# Patient Record
(demographics unavailable — no encounter records)

---

## 2024-11-27 NOTE — HISTORY OF PRESENT ILLNESS
[Mother] : mother [Hours between feeds ___] : Child is fed every [unfilled] hours [Breast milk] : breast milk [Formula ___ oz/feed] : [unfilled] oz of formula per feed [Fruit] : fruit [Vegetables] : vegetables [Table food] : table food [Normal] : Normal [In crib] : In crib [Pacifier use] : Pacifier use [Toothpaste] : Primary Fluoride Source: Toothpaste [Playtime] : Playtime  [No] : No cigarette smoke exposure [Car seat in back seat] : Car seat in back seat [Up to date] : Up to date

## 2024-11-27 NOTE — DISCUSSION/SUMMARY
[Family Support] : family support [Establishing Routines] : establishing routines [Feeding and Appetite Changes] : feeding and appetite changes [Establishing A Dental Home] : establishing a dental home [Safety] : safety [] : The components of the vaccine(s) to be administered today are listed in the plan of care. The disease(s) for which the vaccine(s) are intended to prevent and the risks have been discussed with the caretaker.  The risks are also included in the appropriate vaccination information statements which have been provided to the patient's caregiver.  The caregiver has given consent to vaccinate. [FreeTextEntry1] :  12 month female growing and developing well.  Transition to whole cow's milk. Continue table foods, 3 meals with 2-3 snacks per day. Incorporate up to 6 oz of flourinated water daily in a sippy cup. Brush teeth twice a day with soft toothbrush. Recommend visit to dentist. When in car, keep child in rear-facing car seats until age 2, or until  the maximum height and weight for seat is reached. Put baby to sleep in own crib with no loose or soft bedding. Lower crib matress. Help baby to maintain consistent daily routines and sleep schedule. Recognize stranger and separation anxiety. Ensure home is safe since baby is increasingly mobile. Be within arm's reach of baby at all times. Use consistent, positive discipline. Avoid screen time. Read aloud to baby. Counseling provided on vaccines given today.  Return 1 mo for HepA/Flu Return for 15 mo WCC/MMR &Martin

## 2024-11-27 NOTE — PHYSICAL EXAM
[Alert] : alert [Normocephalic] : normocephalic [Closed Anterior [place]] : closed anterior fontanelle [Red Reflex] : red reflex bilateral [PERRL] : PERRL [Normally Placed Ears] : normally placed ears [Auricles Well Formed] : auricles well formed [Clear Tympanic membranes] : clear tympanic membranes [Light reflex present] : light reflex present [Bony landmarks visible] : bony landmarks visible [Discharge] : no discharge [Nares Patent] : nares patent [Palate Intact] : palate intact [Uvula Midline] : uvula midline [Tooth Eruption] : tooth eruption [Supple, full passive range of motion] : supple, full passive range of motion [Palpable Masses] : no palpable masses [Symmetric Chest Rise] : symmetric chest rise [Clear to Auscultation Bilaterally] : clear to auscultation bilaterally [Regular Rate and Rhythm] : regular rate and rhythm [S1, S2 present] : S1, S2 present [Murmurs] : no murmurs [+2 Femoral Pulses] : (+) 2 femoral pulses [Soft] : soft [Tender] : nontender [Distended] : nondistended [Bowel Sounds] : normoactive bowel sounds [Hepatomegaly] : no hepatomegaly [Splenomegaly] : no splenomegaly [Normal External Genitalia] : normal external genitalia [Clitoromegaly] : no clitoromegaly [Normal Vaginal Introitus] : normal vaginal introitus [No Abnormal Lymph Nodes Palpated] : no abnormal lymph nodes palpated [Symmetric Abduction and Rotation of Hips] : symmetric abduction and rotation of hips [Allis Sign] : negative Allis sign [Straight] : straight [Cranial Nerves Grossly Intact] : cranial nerves grossly intact [Rash or Lesions] : no rash/lesions

## 2024-11-27 NOTE — DEVELOPMENTAL MILESTONES
[Imitates new gestures] : imitates new gestures [Says "Dad" or "Mom" with meaning] : says "Dad" or "Mom" with meaning [Follows a verbal command that] : follows a verbal command that includes a gesture [Stands without support] : stands without support [Picks up food and eats it] : picks up food and eats it

## 2025-01-08 NOTE — PHYSICAL EXAM
[NL] : moves all extremities x4, warm, well perfused x4 [Dry] : dry [Patches] : patches [Face] : face [Legs] : legs

## 2025-01-08 NOTE — DISCUSSION/SUMMARY
[FreeTextEntry1] :  13 month female comes in today tolerating dairy. [] : The components of the vaccine(s) to be administered today are listed in the plan of care. The disease(s) for which the vaccine(s) are intended to prevent and the risks have been discussed with the caretaker.  The risks are also included in the appropriate vaccination information statements which have been provided to the patient's caregiver.  The caregiver has given consent to vaccinate.

## 2025-01-08 NOTE — HISTORY OF PRESENT ILLNESS
[de-identified] : milk protein intolerance [FreeTextEntry6] :  13 mo female has been tolerating yogurt, cheese, baked goods. Mother has given some cow's milk that she feels may make her eczema worse. No blood in stool. No abdominal pain.

## 2025-02-19 NOTE — DISCUSSION/SUMMARY
[FreeTextEntry1] : 14 mo old female here with flu like illness  Well appearing, well hydrated, though febrile in office. motrin given  Clear lungs upon auscultation. Unremarkable oropharynx and otic exam. Supportive care: Nasal saline, suction, humidifier as needed. Drink plenty of fluids. Fever reducing medications as needed. OTC cough suppressants not recommended in children under the age of 7. Can try Zarbee's or Monica's herbal cough soothing syrup. Avoid those that have honey if under 1 year of age. Seek immediate attention for difficulty breathing, dehydration or prolonged fever (more than 5 days)/elevated fever (greater than 105). Check back if symptoms are prolonged, worsening or if new concerns arise. rapid flu test negative, will send rvp and f/u results.   Parent/Guardian agree with and express understanding of plan.

## 2025-02-19 NOTE — HISTORY OF PRESENT ILLNESS
[EENT/Resp Symptoms] : EENT/RESPIRATORY SYMPTOMS [Nasal congestion] : nasal congestion [Runny nose] : runny nose [___ Day(s)] : [unfilled] day(s) [Intermittent] : intermittent [Consolable] : consolable [Decreased appetite] : decreased appetite [Known Exposure to COVID-19] : no known exposure to COVID-19 [Hx of recent COVID-19 infection] : no history of recent COVID-19 infection [Sick Contacts: ___] : no sick contacts [Clear rhinorrhea] : clear rhinorrhea [Acetaminophen] : acetaminophen [Fever] : fever [Change in sleep pattern] : no change in sleep pattern [Eye Redness] : no eye redness [Eye Discharge] : no eye discharge [Eye Itching] : no eye itching [Ear Tugging] : no ear tugging [Runny Nose] : runny nose [Nasal Congestion] : nasal congestion [Teething] : teething [Cough] : no cough [Wheezing] : no wheezing [Decreased Appetite] : decreased appetite [Posttussive emesis] : no posttussive emesis [Vomiting] : no vomiting [Diarrhea] : no diarrhea [Decreased Urine Output] : no decreased urine output [Rash] : no rash [Max Temp: ____] : Max temperature: [unfilled] [FreeTextEntry2] : fever

## 2025-02-19 NOTE — PHYSICAL EXAM
[Acute Distress] : no acute distress [Alert] : alert [EOMI] : grossly EOMI [Clear] : right tympanic membrane clear [Pink Nasal Mucosa] : pink nasal mucosa [Clear Rhinorrhea] : clear rhinorrhea [Erythematous Oropharynx] : nonerythematous oropharynx [Enlarged Tonsils] : tonsils not enlarged [Supple] : supple [Clear to Auscultation Bilaterally] : clear to auscultation bilaterally [Wheezing] : no wheezing [Rales] : no rales [Crackles] : no crackles [Tachypnea] : no tachypnea [Rhonchi] : no rhonchi [Belly Breathing] : no belly breathing [Regular Rate and Rhythm] : regular rate and rhythm [Moves All Extremities x 4] : moves all extremities x4 [Warm, Well Perfused x4] : warm, well perfused x4 [Capillary Refill <2s] : capillary refill < 2s [Warm] : warm [FreeTextEntry4] : + nasal congestion  [de-identified] : no rash, MMM

## 2025-05-21 NOTE — HISTORY OF PRESENT ILLNESS
[Mother] : mother [Cow's milk (Ounces per day ___)] : consumes [unfilled] oz of Cow's milk per day [Fruit] : fruit [Vegetables] : vegetables [Table food] : table food [Normal] : Normal [In crib] : In crib [Sippy cup use] : Sippy cup use [Yes] : Patient goes to dentist yearly [Toothpaste] : Primary Fluoride Source: Toothpaste [Playtime] : Playtime  [No] : No cigarette smoke exposure [Car seat in back seat] : Car seat in back seat [Up to date] : Up to date

## 2025-05-21 NOTE — DEVELOPMENTAL MILESTONES
[Engages with others for play] : engages with others for play [Points to pictures in book] : points to pictures in book [Identifies at least 2 body parts] : identifies at least 2 body parts [Carries toy while walking] : carries toy while walking [Scribbles spontaneously] : scribbles spontaneously [Uses 6 to 10 words other than] : does not use 6 to 10 words other than names [Passed] : passed [Yes] : Completed.

## 2025-05-21 NOTE — PHYSICAL EXAM
[Alert] : alert [No Acute Distress] : no acute distress [Normocephalic] : normocephalic [Anterior [place] Closed] : anterior fontanelle closed [Red Reflex Bilateral] : red reflex bilateral [PERRL] : PERRL [Normally Placed Ears] : normally placed ears [Auricles Well Formed] : auricles well formed [Clear Tympanic membranes with present light reflex and bony landmarks] : clear tympanic membranes with present light reflex and bony landmarks [No Discharge] : no discharge supervision/verbal cues/1 person assist/2 person assist [Nares Patent] : nares patent [Palate Intact] : palate intact [Uvula Midline] : uvula midline [Tooth Eruption] : tooth eruption  [Supple, full passive range of motion] : supple, full passive range of motion [No Palpable Masses] : no palpable masses [Symmetric Chest Rise] : symmetric chest rise [Clear to Auscultation Bilaterally] : clear to auscultation bilaterally [Regular Rate and Rhythm] : regular rate and rhythm [S1, S2 present] : S1, S2 present [No Murmurs] : no murmurs [+2 Femoral Pulses] : +2 femoral pulses [Soft] : soft [NonTender] : non tender [Non Distended] : non distended [Normoactive Bowel Sounds] : normoactive bowel sounds [No Hepatomegaly] : no hepatomegaly [No Splenomegaly] : no splenomegaly [Lloyd 1] : Lloyd 1 [No Clitoromegaly] : no clitoromegaly [Normal Vaginal Introitus] : normal vaginal introitus [Patent] : patent [Normally Placed] : normally placed [No Abnormal Lymph Nodes Palpated] : no abnormal lymph nodes palpated [No Clavicular Crepitus] : no clavicular crepitus [Symmetric Buttocks Creases] : symmetric buttocks creases [No Spinal Dimple] : no spinal dimple [NoTuft of Hair] : no tuft of hair [Cranial Nerves Grossly Intact] : cranial nerves grossly intact [No Rash or Lesions] : no rash or lesions

## 2025-05-21 NOTE — DISCUSSION/SUMMARY
[Family Support] : family support [Child Development and Behavior] : child development and behavior [Language Promotion/Hearing] : language promotion/hearing [[place] Training Readiness] : toliet training readiness [Safety] : safety [Mother] : mother [] : The components of the vaccine(s) to be administered today are listed in the plan of care. The disease(s) for which the vaccine(s) are intended to prevent and the risks have been discussed with the caretaker.  The risks are also included in the appropriate vaccination information statements which have been provided to the patient's caregiver.  The caregiver has given consent to vaccinate. [FreeTextEntry1] :  18 month female growing and developing well.  Continue whole cow's milk. Continue table foods, 3 meals with 2-3 snacks per day. Incorporate flourinated water daily in a sippy cup. Brush teeth twice a day with soft toothbrush. Recommend visit to dentist. When in car, keep child in rear-facing car seats until age 2, or until  the maximum height and weight for seat is reached. Put todder to sleep in own bed or crib. Help toddler to maintain consistent daily routines and sleep schedule. Toilet training discussed. Recognize anxiety in new settings. Ensure home is safe. Be within arm's reach of toddler at all times. Use consistent, positive discipline. Read aloud to toddler. Counseling provided on vaccines given today.

## 2025-05-21 NOTE — PHYSICAL EXAM
